# Patient Record
Sex: MALE | Employment: OTHER | URBAN - METROPOLITAN AREA
[De-identification: names, ages, dates, MRNs, and addresses within clinical notes are randomized per-mention and may not be internally consistent; named-entity substitution may affect disease eponyms.]

---

## 2017-04-14 ENCOUNTER — HOSPITAL ENCOUNTER (OUTPATIENT)
Dept: RADIOLOGY | Facility: MEDICAL CENTER | Age: 67
End: 2017-04-14
Attending: INTERNAL MEDICINE
Payer: COMMERCIAL

## 2017-04-14 DIAGNOSIS — Z13.9 SCREENING: ICD-10-CM

## 2017-04-14 PROCEDURE — 4410556 CT-CARDIAC SCORING

## 2019-05-15 ENCOUNTER — TELEPHONE (OUTPATIENT)
Dept: TELEMETRY | Facility: MEDICAL CENTER | Age: 69
End: 2019-05-15

## 2019-05-15 DIAGNOSIS — Z00.6 RESEARCH STUDY PATIENT: ICD-10-CM

## 2019-05-15 NOTE — TELEPHONE ENCOUNTER
[facility] [place] [facility] Calcium CT Trial:  Mr. Oliveira contacted via telephone by Ely Odom, study team member for enrollment in the above stated trial. Informed consent form reviewed, questions answered.  Consent signed via [employer] by Mr. Oliveira on 5/11/19 and witnessed by Ely Odom on 5/13/2019.  Order for CT scan placed in [facility].

## 2019-06-03 ENCOUNTER — APPOINTMENT (OUTPATIENT)
Dept: RADIOLOGY | Facility: MEDICAL CENTER | Age: 69
End: 2019-06-03
Attending: INTERNAL MEDICINE

## 2019-06-12 ENCOUNTER — HOSPITAL ENCOUNTER (OUTPATIENT)
Dept: RADIOLOGY | Facility: MEDICAL CENTER | Age: 69
End: 2019-06-12
Attending: INTERNAL MEDICINE

## 2019-06-12 DIAGNOSIS — Z00.6 RESEARCH STUDY PATIENT: ICD-10-CM

## 2019-06-12 PROCEDURE — 4410556 CT-CARDIAC SCORING

## 2019-06-14 ENCOUNTER — TELEPHONE (OUTPATIENT)
Dept: CARDIOLOGY | Facility: MEDICAL CENTER | Age: 69
End: 2019-06-14

## 2019-06-14 NOTE — TELEPHONE ENCOUNTER
[facility] [place] Cardiac Calcium Scoring CT study: Spoke with Mr. Oliveira today, 6-14-19 via phone. Discussed CT results as reviewed by Dr. Goldberg.    Findings:     Coronary calcification:  LMA - 0  LCX - 5.5  LAD -- 66.1  RCA - 0  PDA - 0  Calcium score:  71.6    Impression: Low risk Calcium score.    Calcium score is within the 50th percentile for patient’s age and gender.    Recommend following up with PCP.    Encouraged to contact study team with any questions.

## 2019-07-03 PROBLEM — Z00.6 RESEARCH STUDY PATIENT: Status: RESOLVED | Noted: 2019-05-15 | Resolved: 2019-07-03
